# Patient Record
Sex: FEMALE | Race: WHITE | URBAN - METROPOLITAN AREA
[De-identification: names, ages, dates, MRNs, and addresses within clinical notes are randomized per-mention and may not be internally consistent; named-entity substitution may affect disease eponyms.]

---

## 2020-10-30 ENCOUNTER — TELEPHONE ENCOUNTER (OUTPATIENT)
Dept: URBAN - METROPOLITAN AREA CLINIC 98 | Facility: CLINIC | Age: 50
End: 2020-10-30

## 2020-10-30 RX ORDER — PANTOPRAZOLE SODIUM 40 MG/1
TAKE 1 TABLET (40 MG) BY ORAL ROUTE ONCE DAILY FOR 30 DAYS TABLET, DELAYED RELEASE ORAL 1
Qty: 30 | Refills: 6
Start: 2018-09-12

## 2020-12-10 ENCOUNTER — OFFICE VISIT (OUTPATIENT)
Dept: URBAN - METROPOLITAN AREA TELEHEALTH 2 | Facility: TELEHEALTH | Age: 50
End: 2020-12-10

## 2020-12-10 ENCOUNTER — OFFICE VISIT (OUTPATIENT)
Dept: URBAN - METROPOLITAN AREA TELEHEALTH 2 | Facility: TELEHEALTH | Age: 50
End: 2020-12-10
Payer: COMMERCIAL

## 2020-12-10 VITALS — HEIGHT: 64 IN

## 2020-12-10 DIAGNOSIS — R10.33 PERIUMBILICAL ABDOMINAL PAIN: ICD-10-CM

## 2020-12-10 DIAGNOSIS — D12.5 ADENOMA OF SIGMOID COLON: ICD-10-CM

## 2020-12-10 DIAGNOSIS — D12.6 ADENOMATOUS POLYP OF COLON, UNSPECIFIED PART OF COLON: ICD-10-CM

## 2020-12-10 PROCEDURE — G9903 PT SCRN TBCO ID AS NON USER: HCPCS | Performed by: INTERNAL MEDICINE

## 2020-12-10 PROCEDURE — G8417 CALC BMI ABV UP PARAM F/U: HCPCS | Performed by: INTERNAL MEDICINE

## 2020-12-10 PROCEDURE — 1036F TOBACCO NON-USER: CPT | Performed by: INTERNAL MEDICINE

## 2020-12-10 PROCEDURE — 99213 OFFICE O/P EST LOW 20 MIN: CPT | Performed by: INTERNAL MEDICINE

## 2020-12-10 PROCEDURE — 3017F COLORECTAL CA SCREEN DOC REV: CPT | Performed by: INTERNAL MEDICINE

## 2020-12-10 RX ORDER — PANTOPRAZOLE SODIUM 40 MG/1
TAKE 1 TABLET (40 MG) BY ORAL ROUTE ONCE DAILY FOR 30 DAYS TABLET, DELAYED RELEASE ORAL 1
Qty: 30 | Refills: 6 | Status: ACTIVE | COMMUNITY
Start: 2018-09-12

## 2020-12-10 RX ORDER — NORETHINDRONE ACETATE AND ETHINYL ESTRADIOL 1.5; 3 MG/1; UG/1
TAKE 1 TABLET BY ORAL ROUTE ONCE DAILY TABLET ORAL 1
Qty: 0 | Refills: 0 | Status: ACTIVE | COMMUNITY
Start: 1900-01-01 | End: 1900-01-01

## 2020-12-10 RX ORDER — DICYCLOMINE HYDROCHLORIDE 10 MG/1
1 TABLET CAPSULE ORAL THREE TIMES A DAY
Qty: 90 | Refills: 3 | OUTPATIENT
Start: 2020-12-10

## 2020-12-10 NOTE — HPI-TODAY'S VISIT:
Done video on [employer]. had EGD and colon in 11/19 Polyps and GERD- no abreu's noted. Using the pantoprazole- seems to help.  Nagging discomfort in the left periublical region. Intermittent.  Burping does help.  No CIBH - mild constipation.  No rectal bleeding.

## 2021-02-05 ENCOUNTER — TELEPHONE ENCOUNTER (OUTPATIENT)
Dept: URBAN - METROPOLITAN AREA CLINIC 98 | Facility: CLINIC | Age: 51
End: 2021-02-05

## 2021-02-15 ENCOUNTER — LAB OUTSIDE AN ENCOUNTER (OUTPATIENT)
Dept: URBAN - METROPOLITAN AREA CLINIC 98 | Facility: CLINIC | Age: 51
End: 2021-02-15

## 2021-07-15 ENCOUNTER — WEB ENCOUNTER (OUTPATIENT)
Dept: URBAN - METROPOLITAN AREA CLINIC 98 | Facility: CLINIC | Age: 51
End: 2021-07-15

## 2021-07-15 ENCOUNTER — OFFICE VISIT (OUTPATIENT)
Dept: URBAN - METROPOLITAN AREA CLINIC 98 | Facility: CLINIC | Age: 51
End: 2021-07-15
Payer: COMMERCIAL

## 2021-07-15 DIAGNOSIS — R10.9 ABDOMINAL DISCOMFORT: ICD-10-CM

## 2021-07-15 DIAGNOSIS — K21.00 CHRONIC REFLUX ESOPHAGITIS: ICD-10-CM

## 2021-07-15 DIAGNOSIS — Z86.010 PERSONAL HISTORY OF COLONIC POLYPS: ICD-10-CM

## 2021-07-15 PROBLEM — 428283002: Status: ACTIVE | Noted: 2021-07-15

## 2021-07-15 PROCEDURE — 99214 OFFICE O/P EST MOD 30 MIN: CPT | Performed by: INTERNAL MEDICINE

## 2021-07-15 RX ORDER — POLYETHYLENE GLYCOL 3350, SODIUM SULFATE, SODIUM CHLORIDE, POTASSIUM CHLORIDE, ASCORBIC ACID, SODIUM ASCORBATE 140-9-5.2G
AS DIRECTED KIT ORAL
Qty: 1 KIT | Refills: 0 | OUTPATIENT
Start: 2021-07-15 | End: 2021-07-16

## 2021-07-15 RX ORDER — PANTOPRAZOLE SODIUM 40 MG/1
TAKE 1 TABLET (40 MG) BY ORAL ROUTE ONCE DAILY FOR 30 DAYS TABLET, DELAYED RELEASE ORAL 1
Qty: 30 | Refills: 6 | Status: ACTIVE | COMMUNITY
Start: 2018-09-12

## 2021-07-15 RX ORDER — PANTOPRAZOLE SODIUM 40 MG/1
1 TABLET TABLET, DELAYED RELEASE ORAL ONCE A DAY
Qty: 90 TABLET | Refills: 4 | OUTPATIENT
Start: 2021-07-15

## 2021-07-15 RX ORDER — DICYCLOMINE HYDROCHLORIDE 10 MG/1
1 TABLET CAPSULE ORAL THREE TIMES A DAY
Qty: 90 | Refills: 3 | Status: ACTIVE | COMMUNITY
Start: 2020-12-10

## 2021-07-15 RX ORDER — NORETHINDRONE ACETATE AND ETHINYL ESTRADIOL 1.5; 3 MG/1; UG/1
TAKE 1 TABLET BY ORAL ROUTE ONCE DAILY TABLET ORAL 1
Qty: 0 | Refills: 0 | Status: ACTIVE | COMMUNITY
Start: 1900-01-01 | End: 1900-01-01

## 2021-07-15 NOTE — HPI-TODAY'S VISIT:
Done video on [employer]. had EGD and colon in 11/19 Polyps and GERD- no abreu's noted. Using the pantoprazole- seems to help.  Nagging discomfort in the left periublical region. Intermittent.  Burping does help.  No CIBH - mild constipation.  No rectal bleeding.   Present- Having nagging discomfort on left flank 1-2 times per week.  Having issues with constipation. moves every 1-2 days.

## 2021-07-16 PROBLEM — 428283002 HISTORY OF POLYP OF COLON: Status: ACTIVE | Noted: 2021-07-16

## 2021-11-08 ENCOUNTER — OFFICE VISIT (OUTPATIENT)
Dept: URBAN - METROPOLITAN AREA SURGERY CENTER 18 | Facility: SURGERY CENTER | Age: 51
End: 2021-11-08
Payer: COMMERCIAL

## 2021-11-08 ENCOUNTER — TELEPHONE ENCOUNTER (OUTPATIENT)
Dept: URBAN - METROPOLITAN AREA CLINIC 92 | Facility: CLINIC | Age: 51
End: 2021-11-08

## 2021-11-08 ENCOUNTER — CLAIMS CREATED FROM THE CLAIM WINDOW (OUTPATIENT)
Dept: URBAN - METROPOLITAN AREA CLINIC 4 | Facility: CLINIC | Age: 51
End: 2021-11-08
Payer: COMMERCIAL

## 2021-11-08 DIAGNOSIS — K22.89 OTHER SPECIFIED DISEASE OF ESOPHAGUS: ICD-10-CM

## 2021-11-08 DIAGNOSIS — K22.8 OTHER SPECIFIED DISEASES OF ESOPHAGUS: ICD-10-CM

## 2021-11-08 DIAGNOSIS — Z86.010 ADENOMAS PERSONAL HISTORY OF COLONIC POLYPS: ICD-10-CM

## 2021-11-08 DIAGNOSIS — K31.89 ACQUIRED DEFORMITY OF DUODENUM: ICD-10-CM

## 2021-11-08 DIAGNOSIS — R12 BURNING REFLUX: ICD-10-CM

## 2021-11-08 DIAGNOSIS — K31.89 DEFORMED PYLORUS, ACQUIRED: ICD-10-CM

## 2021-11-08 PROCEDURE — 43239 EGD BIOPSY SINGLE/MULTIPLE: CPT | Performed by: INTERNAL MEDICINE

## 2021-11-08 PROCEDURE — G8907 PT DOC NO EVENTS ON DISCHARG: HCPCS | Performed by: INTERNAL MEDICINE

## 2021-11-08 PROCEDURE — G0105 COLORECTAL SCRN; HI RISK IND: HCPCS | Performed by: INTERNAL MEDICINE

## 2021-11-08 PROCEDURE — 88305 TISSUE EXAM BY PATHOLOGIST: CPT | Performed by: PATHOLOGY

## 2021-11-08 PROCEDURE — 88312 SPECIAL STAINS GROUP 1: CPT | Performed by: PATHOLOGY

## 2021-11-08 RX ORDER — PANTOPRAZOLE SODIUM 40 MG/1
1 TABLET TABLET, DELAYED RELEASE ORAL ONCE A DAY
Qty: 90 TABLET | Refills: 4 | OUTPATIENT
Start: 2021-11-08

## 2021-11-08 RX ORDER — NORETHINDRONE ACETATE AND ETHINYL ESTRADIOL 1.5; 3 MG/1; UG/1
TAKE 1 TABLET BY ORAL ROUTE ONCE DAILY TABLET ORAL 1
Qty: 0 | Refills: 0 | Status: ACTIVE | COMMUNITY
Start: 1900-01-01 | End: 1900-01-01

## 2021-11-08 RX ORDER — DICYCLOMINE HYDROCHLORIDE 10 MG/1
1 TABLET CAPSULE ORAL THREE TIMES A DAY
Qty: 90 | Refills: 3 | Status: ACTIVE | COMMUNITY
Start: 2020-12-10

## 2021-11-08 RX ORDER — PANTOPRAZOLE SODIUM 40 MG/1
TAKE 1 TABLET (40 MG) BY ORAL ROUTE ONCE DAILY FOR 30 DAYS TABLET, DELAYED RELEASE ORAL 1
Qty: 30 | Refills: 6 | Status: ACTIVE | COMMUNITY
Start: 2018-09-12

## 2021-11-08 RX ORDER — PANTOPRAZOLE SODIUM 40 MG/1
1 TABLET TABLET, DELAYED RELEASE ORAL ONCE A DAY
Qty: 90 TABLET | Refills: 4 | Status: ACTIVE | COMMUNITY
Start: 2021-07-15

## 2021-11-11 ENCOUNTER — WEB ENCOUNTER (OUTPATIENT)
Dept: URBAN - METROPOLITAN AREA CLINIC 96 | Facility: CLINIC | Age: 51
End: 2021-11-11

## 2021-11-13 ENCOUNTER — WEB ENCOUNTER (OUTPATIENT)
Dept: URBAN - METROPOLITAN AREA CLINIC 98 | Facility: CLINIC | Age: 51
End: 2021-11-13

## 2021-11-17 ENCOUNTER — WEB ENCOUNTER (OUTPATIENT)
Dept: URBAN - METROPOLITAN AREA CLINIC 96 | Facility: CLINIC | Age: 51
End: 2021-11-17

## 2021-11-17 RX ORDER — PANTOPRAZOLE SODIUM 20 MG/1
1 TABLET TABLET, DELAYED RELEASE ORAL ONCE A DAY
Qty: 30 | Refills: 2 | OUTPATIENT
Start: 2021-11-18

## 2021-11-18 ENCOUNTER — WEB ENCOUNTER (OUTPATIENT)
Dept: URBAN - METROPOLITAN AREA CLINIC 96 | Facility: CLINIC | Age: 51
End: 2021-11-18

## 2021-11-19 ENCOUNTER — WEB ENCOUNTER (OUTPATIENT)
Dept: URBAN - METROPOLITAN AREA CLINIC 96 | Facility: CLINIC | Age: 51
End: 2021-11-19

## 2023-05-09 ENCOUNTER — OFFICE VISIT (OUTPATIENT)
Dept: URBAN - METROPOLITAN AREA CLINIC 82 | Facility: CLINIC | Age: 53
End: 2023-05-09

## 2023-05-09 RX ORDER — PANTOPRAZOLE SODIUM 40 MG/1
1 TABLET TABLET, DELAYED RELEASE ORAL ONCE A DAY
Qty: 90 TABLET | Refills: 4 | Status: ACTIVE | COMMUNITY
Start: 2021-11-08

## 2023-05-09 RX ORDER — PANTOPRAZOLE SODIUM 40 MG/1
1 TABLET TABLET, DELAYED RELEASE ORAL ONCE A DAY
Qty: 90 TABLET | Refills: 4 | Status: ACTIVE | COMMUNITY
Start: 2021-07-15

## 2023-05-09 RX ORDER — NORETHINDRONE ACETATE AND ETHINYL ESTRADIOL 1.5; 3 MG/1; UG/1
TAKE 1 TABLET BY ORAL ROUTE ONCE DAILY TABLET ORAL 1
Qty: 0 | Refills: 0 | Status: ACTIVE | COMMUNITY
Start: 1900-01-01 | End: 1900-01-01

## 2023-05-09 RX ORDER — PANTOPRAZOLE SODIUM 20 MG/1
1 TABLET TABLET, DELAYED RELEASE ORAL ONCE A DAY
Qty: 30 | Refills: 2 | Status: ACTIVE | COMMUNITY
Start: 2021-11-18

## 2023-05-09 RX ORDER — PANTOPRAZOLE SODIUM 40 MG/1
TAKE 1 TABLET (40 MG) BY ORAL ROUTE ONCE DAILY FOR 30 DAYS TABLET, DELAYED RELEASE ORAL 1
Qty: 30 | Refills: 6 | Status: ACTIVE | COMMUNITY
Start: 2018-09-12

## 2023-05-09 RX ORDER — DICYCLOMINE HYDROCHLORIDE 10 MG/1
1 TABLET CAPSULE ORAL THREE TIMES A DAY
Qty: 90 | Refills: 3 | Status: ACTIVE | COMMUNITY
Start: 2020-12-10

## 2023-05-23 ENCOUNTER — OFFICE VISIT (OUTPATIENT)
Dept: URBAN - METROPOLITAN AREA CLINIC 82 | Facility: CLINIC | Age: 53
End: 2023-05-23
Payer: COMMERCIAL

## 2023-05-23 VITALS
HEIGHT: 64 IN | SYSTOLIC BLOOD PRESSURE: 118 MMHG | DIASTOLIC BLOOD PRESSURE: 81 MMHG | BODY MASS INDEX: 32.78 KG/M2 | WEIGHT: 192 LBS | TEMPERATURE: 97.1 F | HEART RATE: 96 BPM

## 2023-05-23 DIAGNOSIS — R14.0 BLOATING: ICD-10-CM

## 2023-05-23 DIAGNOSIS — K42.9 UMBILICAL HERNIA WITHOUT OBSTRUCTION AND WITHOUT GANGRENE: ICD-10-CM

## 2023-05-23 DIAGNOSIS — R10.84 GENERALIZED ABDOMINAL PAIN: ICD-10-CM

## 2023-05-23 DIAGNOSIS — K59.01 SLOW TRANSIT CONSTIPATION: ICD-10-CM

## 2023-05-23 PROBLEM — 35298007: Status: ACTIVE | Noted: 2023-05-23

## 2023-05-23 PROCEDURE — G8417 CALC BMI ABV UP PARAM F/U: HCPCS | Performed by: INTERNAL MEDICINE

## 2023-05-23 PROCEDURE — G8427 DOCREV CUR MEDS BY ELIG CLIN: HCPCS | Performed by: INTERNAL MEDICINE

## 2023-05-23 PROCEDURE — G9903 PT SCRN TBCO ID AS NON USER: HCPCS | Performed by: INTERNAL MEDICINE

## 2023-05-23 PROCEDURE — 99214 OFFICE O/P EST MOD 30 MIN: CPT | Performed by: INTERNAL MEDICINE

## 2023-05-23 RX ORDER — PANTOPRAZOLE SODIUM 20 MG/1
1 TABLET TABLET, DELAYED RELEASE ORAL ONCE A DAY
Qty: 30 | Refills: 2 | Status: ACTIVE | COMMUNITY
Start: 2021-11-18

## 2023-05-23 RX ORDER — LINACLOTIDE 72 UG/1
1 CAPSULE AT LEAST 30 MINUTES BEFORE THE FIRST MEAL OF THE DAY ON AN EMPTY STOMACH CAPSULE, GELATIN COATED ORAL ONCE A DAY
Qty: 30 | Refills: 1 | OUTPATIENT
Start: 2023-05-23 | End: 2023-07-22

## 2023-05-23 RX ORDER — DICYCLOMINE HYDROCHLORIDE 10 MG/1
1 TABLET CAPSULE ORAL THREE TIMES A DAY
Qty: 90 | Refills: 3 | Status: DISCONTINUED | COMMUNITY
Start: 2020-12-10

## 2023-05-23 RX ORDER — NORETHINDRONE ACETATE AND ETHINYL ESTRADIOL 1.5; 3 MG/1; UG/1
TAKE 1 TABLET BY ORAL ROUTE ONCE DAILY TABLET ORAL 1
Qty: 0 | Refills: 0 | Status: DISCONTINUED | COMMUNITY
Start: 1900-01-01

## 2023-05-23 RX ORDER — PANTOPRAZOLE SODIUM 40 MG/1
1 TABLET TABLET, DELAYED RELEASE ORAL ONCE A DAY
Qty: 90 TABLET | Refills: 4 | Status: DISCONTINUED | COMMUNITY
Start: 2021-07-15

## 2023-05-23 NOTE — HPI-TODAY'S VISIT:
5/23/2023 Patient is a 53 year old  female who presents for abdominal pain and weight gain. Patient states that she has a lot of bloating and tenderness in her lower abdominal area. Patient states that she has constipation and that she takes fiber for it which helps some. She states that she has some an umbilical hernia as seen in a CT scan. She states that she has been having a good bit of stress lately.

## 2023-06-19 ENCOUNTER — TELEPHONE ENCOUNTER (OUTPATIENT)
Dept: URBAN - METROPOLITAN AREA CLINIC 95 | Facility: CLINIC | Age: 53
End: 2023-06-19

## 2023-06-23 ENCOUNTER — OFFICE VISIT (OUTPATIENT)
Dept: URBAN - METROPOLITAN AREA CLINIC 82 | Facility: CLINIC | Age: 53
End: 2023-06-23

## 2023-08-17 ENCOUNTER — TELEPHONE ENCOUNTER (OUTPATIENT)
Dept: URBAN - METROPOLITAN AREA CLINIC 97 | Facility: CLINIC | Age: 53
End: 2023-08-17

## 2023-08-17 ENCOUNTER — DASHBOARD ENCOUNTERS (OUTPATIENT)
Age: 53
End: 2023-08-17

## 2023-08-17 ENCOUNTER — OFFICE VISIT (OUTPATIENT)
Dept: URBAN - METROPOLITAN AREA CLINIC 98 | Facility: CLINIC | Age: 53
End: 2023-08-17
Payer: COMMERCIAL

## 2023-08-17 VITALS
HEART RATE: 83 BPM | HEIGHT: 64 IN | WEIGHT: 187 LBS | BODY MASS INDEX: 31.92 KG/M2 | TEMPERATURE: 97.3 F | DIASTOLIC BLOOD PRESSURE: 71 MMHG | SYSTOLIC BLOOD PRESSURE: 110 MMHG

## 2023-08-17 DIAGNOSIS — K42.9 UMBILICAL HERNIA WITHOUT OBSTRUCTION AND WITHOUT GANGRENE: ICD-10-CM

## 2023-08-17 DIAGNOSIS — K76.9 LIVER LESION: ICD-10-CM

## 2023-08-17 DIAGNOSIS — R10.84 GENERALIZED ABDOMINAL PAIN: ICD-10-CM

## 2023-08-17 DIAGNOSIS — K59.01 SLOW TRANSIT CONSTIPATION: ICD-10-CM

## 2023-08-17 DIAGNOSIS — R14.0 BLOATING: ICD-10-CM

## 2023-08-17 PROBLEM — 300331000: Status: ACTIVE | Noted: 2023-08-17

## 2023-08-17 PROCEDURE — 99214 OFFICE O/P EST MOD 30 MIN: CPT | Performed by: INTERNAL MEDICINE

## 2023-08-17 RX ORDER — LINACLOTIDE 72 UG/1
1 CAPSULE AT LEAST 30 MINUTES BEFORE THE FIRST MEAL OF THE DAY ON AN EMPTY STOMACH CAPSULE, GELATIN COATED ORAL ONCE A DAY
Qty: 30 | Refills: 6 | OUTPATIENT
Start: 2023-08-17 | End: 2024-03-14

## 2023-08-17 NOTE — HPI-TODAY'S VISIT:
5/23/2023 Patient is a 53 year old  female who presents for abdominal pain and weight gain. Patient states that she has a lot of bloating and tenderness in her lower abdominal area. Patient states that she has constipation and that she takes fiber for it which helps some. She states that she has some an umbilical hernia as seen in a CT scan. She states that she has been having a good bit of stress lately.  Present - issues with bloating - non contrast CT- liver lesion- small umblical hernia - recurrent UTI - EGD and colon in 2021 - more GERD as well - more constipation  as well.

## 2023-09-21 ENCOUNTER — WEB ENCOUNTER (OUTPATIENT)
Dept: URBAN - METROPOLITAN AREA CLINIC 98 | Facility: CLINIC | Age: 53
End: 2023-09-21

## 2023-09-25 ENCOUNTER — WEB ENCOUNTER (OUTPATIENT)
Dept: URBAN - METROPOLITAN AREA CLINIC 98 | Facility: CLINIC | Age: 53
End: 2023-09-25

## 2023-09-25 RX ORDER — LINACLOTIDE 145 UG/1
1 CAPSULE AT LEAST 30 MINUTES BEFORE THE FIRST MEAL OF THE DAY ON AN EMPTY STOMACH CAPSULE, GELATIN COATED ORAL ONCE A DAY
Qty: 30 | Refills: 3 | OUTPATIENT
Start: 2023-09-25 | End: 2023-10-25

## 2023-10-02 ENCOUNTER — WEB ENCOUNTER (OUTPATIENT)
Dept: URBAN - METROPOLITAN AREA CLINIC 98 | Facility: CLINIC | Age: 53
End: 2023-10-02

## 2024-07-18 ENCOUNTER — OFFICE VISIT (OUTPATIENT)
Dept: URBAN - METROPOLITAN AREA CLINIC 98 | Facility: CLINIC | Age: 54
End: 2024-07-18

## 2024-08-13 ENCOUNTER — WEB ENCOUNTER (OUTPATIENT)
Dept: URBAN - METROPOLITAN AREA CLINIC 98 | Facility: CLINIC | Age: 54
End: 2024-08-13

## 2024-08-13 RX ORDER — HYDROCORTISONE ACETATE, PRAMOXINE HCL 2.5; 1 G/100G; G/100G
1 APPLICATION CREAM TOPICAL THREE TIMES A DAY
Qty: 1 KIT | Refills: 0 | OUTPATIENT
Start: 2024-08-14 | End: 2024-08-28

## 2024-08-14 ENCOUNTER — WEB ENCOUNTER (OUTPATIENT)
Dept: URBAN - METROPOLITAN AREA CLINIC 98 | Facility: CLINIC | Age: 54
End: 2024-08-14

## 2024-08-14 RX ORDER — HYDROCORTISONE 25 MG/G
1 APPLICATION CREAM TOPICAL TWICE A DAY
Qty: 1 KIT | Refills: 0 | OUTPATIENT
Start: 2024-08-14 | End: 2024-08-28

## 2024-09-05 ENCOUNTER — OFFICE VISIT (OUTPATIENT)
Dept: URBAN - METROPOLITAN AREA CLINIC 98 | Facility: CLINIC | Age: 54
End: 2024-09-05

## 2024-09-05 ENCOUNTER — LAB OUTSIDE AN ENCOUNTER (OUTPATIENT)
Dept: URBAN - METROPOLITAN AREA CLINIC 98 | Facility: CLINIC | Age: 54
End: 2024-09-05

## 2024-09-05 ENCOUNTER — TELEPHONE ENCOUNTER (OUTPATIENT)
Dept: URBAN - METROPOLITAN AREA CLINIC 98 | Facility: CLINIC | Age: 54
End: 2024-09-05

## 2024-09-05 PROBLEM — 428283002: Status: ACTIVE | Noted: 2024-09-05

## 2024-09-05 PROBLEM — 266433003: Status: ACTIVE | Noted: 2024-09-05

## 2024-09-20 ENCOUNTER — WEB ENCOUNTER (OUTPATIENT)
Dept: URBAN - METROPOLITAN AREA CLINIC 98 | Facility: CLINIC | Age: 54
End: 2024-09-20

## 2024-10-21 ENCOUNTER — OFFICE VISIT (OUTPATIENT)
Dept: URBAN - METROPOLITAN AREA CLINIC 98 | Facility: CLINIC | Age: 54
End: 2024-10-21

## 2024-10-22 ENCOUNTER — WEB ENCOUNTER (OUTPATIENT)
Dept: URBAN - METROPOLITAN AREA CLINIC 98 | Facility: CLINIC | Age: 54
End: 2024-10-22

## 2024-10-23 ENCOUNTER — WEB ENCOUNTER (OUTPATIENT)
Dept: URBAN - METROPOLITAN AREA CLINIC 98 | Facility: CLINIC | Age: 54
End: 2024-10-23

## 2024-10-26 ENCOUNTER — WEB ENCOUNTER (OUTPATIENT)
Dept: URBAN - METROPOLITAN AREA CLINIC 98 | Facility: CLINIC | Age: 54
End: 2024-10-26

## 2024-10-28 ENCOUNTER — WEB ENCOUNTER (OUTPATIENT)
Dept: URBAN - METROPOLITAN AREA CLINIC 98 | Facility: CLINIC | Age: 54
End: 2024-10-28

## 2024-10-29 ENCOUNTER — OFFICE VISIT (OUTPATIENT)
Dept: URBAN - METROPOLITAN AREA SURGERY CENTER 18 | Facility: SURGERY CENTER | Age: 54
End: 2024-10-29

## 2024-11-01 ENCOUNTER — WEB ENCOUNTER (OUTPATIENT)
Dept: URBAN - METROPOLITAN AREA CLINIC 98 | Facility: CLINIC | Age: 54
End: 2024-11-01

## 2024-11-04 ENCOUNTER — WEB ENCOUNTER (OUTPATIENT)
Dept: URBAN - METROPOLITAN AREA CLINIC 98 | Facility: CLINIC | Age: 54
End: 2024-11-04

## 2024-11-11 ENCOUNTER — OFFICE VISIT (OUTPATIENT)
Dept: URBAN - NONMETROPOLITAN AREA CLINIC 4 | Facility: CLINIC | Age: 54
End: 2024-11-11
Payer: COMMERCIAL

## 2024-11-11 VITALS
HEIGHT: 64 IN | WEIGHT: 143 LBS | HEART RATE: 88 BPM | TEMPERATURE: 97.8 F | DIASTOLIC BLOOD PRESSURE: 66 MMHG | BODY MASS INDEX: 24.41 KG/M2 | SYSTOLIC BLOOD PRESSURE: 97 MMHG

## 2024-11-11 DIAGNOSIS — K64.0 GRADE I HEMORRHOIDS: ICD-10-CM

## 2024-11-11 PROCEDURE — 99212 OFFICE O/P EST SF 10 MIN: CPT | Performed by: INTERNAL MEDICINE

## 2024-11-11 PROCEDURE — 46221 LIGATION OF HEMORRHOID(S): CPT | Performed by: INTERNAL MEDICINE

## 2024-11-11 NOTE — HPI-TODAY'S VISIT:
Pt presents for elective hemorrhoidal banding. Suffers from chronic constipation. Recent increase in stress, loss her father.  s/p C scope on 10/29/24- suffers from chronic constipation. C scope results reviewed. Small internal hemorrhoids  Previous visit:    Pt reports that she has had itching and burning 5/23/2023 Patient is a 53 year old  female who presents for abdominal pain and weight gain. Patient states that she has a lot of bloating and tenderness in her lower abdominal area. Patient states that she has constipation and that she takes fiber for it which helps some. She states that she has some an umbilical hernia as seen in a CT scan. She states that she has been having a good bit of stress lately.  Present - issues with bloating - non contrast CT- liver lesion- small umblical hernia - recurrent UTI - EGD and colon in 2021 - more GERD as well - more constipation  as well.

## 2024-11-13 ENCOUNTER — WEB ENCOUNTER (OUTPATIENT)
Dept: URBAN - NONMETROPOLITAN AREA CLINIC 4 | Facility: CLINIC | Age: 54
End: 2024-11-13

## 2024-11-14 ENCOUNTER — WEB ENCOUNTER (OUTPATIENT)
Dept: URBAN - METROPOLITAN AREA CLINIC 98 | Facility: CLINIC | Age: 54
End: 2024-11-14

## 2024-11-14 ENCOUNTER — WEB ENCOUNTER (OUTPATIENT)
Dept: URBAN - NONMETROPOLITAN AREA CLINIC 4 | Facility: CLINIC | Age: 54
End: 2024-11-14

## 2024-11-15 ENCOUNTER — WEB ENCOUNTER (OUTPATIENT)
Dept: URBAN - NONMETROPOLITAN AREA CLINIC 4 | Facility: CLINIC | Age: 54
End: 2024-11-15

## 2024-12-02 ENCOUNTER — WEB ENCOUNTER (OUTPATIENT)
Dept: URBAN - NONMETROPOLITAN AREA CLINIC 4 | Facility: CLINIC | Age: 54
End: 2024-12-02

## 2024-12-03 ENCOUNTER — OFFICE VISIT (OUTPATIENT)
Dept: URBAN - NONMETROPOLITAN AREA CLINIC 4 | Facility: CLINIC | Age: 54
End: 2024-12-03
Payer: COMMERCIAL

## 2024-12-03 VITALS
SYSTOLIC BLOOD PRESSURE: 97 MMHG | HEART RATE: 79 BPM | DIASTOLIC BLOOD PRESSURE: 75 MMHG | BODY MASS INDEX: 24.59 KG/M2 | WEIGHT: 144 LBS | TEMPERATURE: 98.4 F | HEIGHT: 64 IN

## 2024-12-03 DIAGNOSIS — K64.8 INTERNAL HEMORRHOIDS: ICD-10-CM

## 2024-12-03 DIAGNOSIS — K62.5 RECTAL BLEEDING: ICD-10-CM

## 2024-12-03 PROCEDURE — 99214 OFFICE O/P EST MOD 30 MIN: CPT | Performed by: REGISTERED NURSE

## 2024-12-03 NOTE — PHYSICAL EXAM RECTAL:
normal tone, no external hemorrhoids, no masses palpable, no red blood, Skin tags are present. Superficial skin wound with no tenderness, drainage or bleeding.

## 2024-12-03 NOTE — HPI-TODAY'S VISIT:
Pt presents for elective hemorrhoidal banding. Suffers from chronic constipation. Recent increase in stress, loss her father.  s/p C scope on 10/29/24- suffers from chronic constipation. C scope results reviewed. Small internal hemorrhoids  Previous visit:    Pt reports that she has had itching and burning 5/23/2023 Patient is a 53 year old  female who presents for abdominal pain and weight gain. Patient states that she has a lot of bloating and tenderness in her lower abdominal area. Patient states that she has constipation and that she takes fiber for it which helps some. She states that she has some an umbilical hernia as seen in a CT scan. She states that she has been having a good bit of stress lately.  Present - issues with bloating - non contrast CT- liver lesion- small umblical hernia - recurrent UTI - EGD and colon in 2021 - more GERD as well - more constipation  as well.  12/3/24: Pt RTC with c/o blood on toilet paper that started yesterday after straining with BM. She also noticed a foul smell. She noticed blood on her stool today. No foul smell noted today. She reports rectal pressure, but no severe pain. She takes Miralax and Calm as needed. She reports she scratced herself near rectum while showering a few days prior.
The patient is a 76y Female complaining of

## 2024-12-05 ENCOUNTER — OFFICE VISIT (OUTPATIENT)
Dept: URBAN - NONMETROPOLITAN AREA CLINIC 4 | Facility: CLINIC | Age: 54
End: 2024-12-05

## 2024-12-06 ENCOUNTER — TELEPHONE ENCOUNTER (OUTPATIENT)
Dept: URBAN - NONMETROPOLITAN AREA CLINIC 4 | Facility: CLINIC | Age: 54
End: 2024-12-06

## 2024-12-06 ENCOUNTER — WEB ENCOUNTER (OUTPATIENT)
Dept: URBAN - NONMETROPOLITAN AREA CLINIC 4 | Facility: CLINIC | Age: 54
End: 2024-12-06

## 2024-12-06 RX ORDER — HYDROCORTISONE ACETATE 25 MG/1
1 SUPPOSITORY SUPPOSITORY RECTAL TWICE DAILY
Qty: 28 | Refills: 1 | OUTPATIENT
Start: 2024-12-06 | End: 2025-01-03

## 2024-12-11 ENCOUNTER — WEB ENCOUNTER (OUTPATIENT)
Dept: URBAN - NONMETROPOLITAN AREA CLINIC 4 | Facility: CLINIC | Age: 54
End: 2024-12-11

## 2024-12-11 ENCOUNTER — TELEPHONE ENCOUNTER (OUTPATIENT)
Dept: URBAN - NONMETROPOLITAN AREA CLINIC 4 | Facility: CLINIC | Age: 54
End: 2024-12-11

## 2024-12-12 ENCOUNTER — OFFICE VISIT (OUTPATIENT)
Dept: URBAN - NONMETROPOLITAN AREA CLINIC 4 | Facility: CLINIC | Age: 54
End: 2024-12-12

## 2024-12-19 ENCOUNTER — WEB ENCOUNTER (OUTPATIENT)
Dept: URBAN - NONMETROPOLITAN AREA CLINIC 4 | Facility: CLINIC | Age: 54
End: 2024-12-19

## 2024-12-20 ENCOUNTER — WEB ENCOUNTER (OUTPATIENT)
Dept: URBAN - NONMETROPOLITAN AREA CLINIC 4 | Facility: CLINIC | Age: 54
End: 2024-12-20

## 2024-12-20 ENCOUNTER — OFFICE VISIT (OUTPATIENT)
Dept: URBAN - METROPOLITAN AREA CLINIC 54 | Facility: CLINIC | Age: 54
End: 2024-12-20

## 2024-12-30 ENCOUNTER — OFFICE VISIT (OUTPATIENT)
Dept: URBAN - NONMETROPOLITAN AREA CLINIC 4 | Facility: CLINIC | Age: 54
End: 2024-12-30

## 2025-01-06 ENCOUNTER — WEB ENCOUNTER (OUTPATIENT)
Dept: URBAN - NONMETROPOLITAN AREA CLINIC 4 | Facility: CLINIC | Age: 55
End: 2025-01-06

## 2025-01-13 ENCOUNTER — WEB ENCOUNTER (OUTPATIENT)
Dept: URBAN - NONMETROPOLITAN AREA CLINIC 4 | Facility: CLINIC | Age: 55
End: 2025-01-13

## 2025-01-21 ENCOUNTER — OFFICE VISIT (OUTPATIENT)
Dept: URBAN - NONMETROPOLITAN AREA CLINIC 4 | Facility: CLINIC | Age: 55
End: 2025-01-21
Payer: COMMERCIAL

## 2025-01-21 VITALS
HEART RATE: 77 BPM | DIASTOLIC BLOOD PRESSURE: 70 MMHG | BODY MASS INDEX: 24.92 KG/M2 | HEIGHT: 64 IN | TEMPERATURE: 98.1 F | WEIGHT: 146 LBS | SYSTOLIC BLOOD PRESSURE: 103 MMHG

## 2025-01-21 DIAGNOSIS — K92.1 BLOOD IN STOOL: ICD-10-CM

## 2025-01-21 DIAGNOSIS — K64.0 GRADE I INTERNAL HEMORRHOIDS: ICD-10-CM

## 2025-01-21 PROCEDURE — 46221 LIGATION OF HEMORRHOID(S): CPT | Performed by: INTERNAL MEDICINE

## 2025-01-21 PROCEDURE — 99212 OFFICE O/P EST SF 10 MIN: CPT | Performed by: INTERNAL MEDICINE

## 2025-01-21 NOTE — EXAM-PHYSICAL EXAM
rt posterior hemorrhoidal column persistent (previously banded but dislodge). Associated skin tag. I do not see any additional significant hemorrhoidal internal columns. Grade II  Hemorrhoid banding was performed during today's visit. Informed consent for hemorrhoid procedures was obtained prior to the procedure, either today or previously on file. The risks, alternatives and benefits were discussed completely using visual aids when appropriate. Questions were answered to the best of my ability, including potential limitations of the procedure and possible need for hemorrhoid surgery if necessary. The patient was in the left lateral decubitus position during the procedure. Digital rectal examination were performed as part of the procedure. Stool in rectal vault hard balls.Hemorrhoid banding procedure: banding was performed in standard fashion without complication using the [facility] hemorrhoid banding device. Location: Th rt postrior grade I was banded today. Standard post procedure instructions were discussed with the patient. Written instructions were provided for the patient after the procedure.

## 2025-01-21 NOTE — HPI-TODAY'S VISIT:
Pt presents for elective hemorrhoidal banding. Suffers from chronic constipation. Recent increase in stress, loss her father.  s/p C scope on 10/29/24- suffers from chronic constipation. C scope results reviewed. Small internal hemorrhoids  Previous visit:    Pt reports that she has had itching and burning 5/23/2023 Patient is a 53 year old  female who presents for abdominal pain and weight gain. Patient states that she has a lot of bloating and tenderness in her lower abdominal area. Patient states that she has constipation and that she takes fiber for it which helps some. She states that she has some an umbilical hernia as seen in a CT scan. She states that she has been having a good bit of stress lately.  Present - issues with bloating - non contrast CT- liver lesion- small umblical hernia - recurrent UTI - EGD and colon in 2021 - more GERD as well - more constipation  as well.  12/3/24: Pt RTC with c/o blood on toilet paper that started yesterday after straining with BM. She also noticed a foul smell. She noticed blood on her stool today. No foul smell noted today. She reports rectal pressure, but no severe pain. She takes Miralax and Calm as needed. She reports she scratced herself near rectum while showering a few days prior. 1-21-25 Pt reports that she has lost her dogs recently.   Pt reports that she has had improvement in her rectal pressure. Pt reports that she has had improvement in her bowel movements.   Pt reports that she tries not to push.   Pt reports that she saw the rectal exam.  Pt reports that she was given a prescription for a steroid twice daily and did evenings for about 5 days.  Pt reports that she sometimes feels like she does not empty all the way.   Pt reports that she had the bands to dislodge.  Pt reports that she has external hemorrhoids as well.  States has some billing challenges after last ov with banding. Last visit with left lateral and rt posterior banding

## 2025-02-17 ENCOUNTER — WEB ENCOUNTER (OUTPATIENT)
Dept: URBAN - NONMETROPOLITAN AREA CLINIC 4 | Facility: CLINIC | Age: 55
End: 2025-02-17

## 2025-02-18 ENCOUNTER — OFFICE VISIT (OUTPATIENT)
Dept: URBAN - NONMETROPOLITAN AREA CLINIC 4 | Facility: CLINIC | Age: 55
End: 2025-02-18

## 2025-02-18 NOTE — HPI-TODAY'S VISIT:
Pt presents for elective hemorrhoidal banding. Suffers from chronic constipation. Recent increase in stress, loss her father.  s/p C scope on 10/29/24- suffers from chronic constipation. C scope results reviewed. Small internal hemorrhoids  Previous visit:    Pt reports that she has had itching and burning 5/23/2023 Patient is a 53 year old  female who presents for abdominal pain and weight gain. Patient states that she has a lot of bloating and tenderness in her lower abdominal area. Patient states that she has constipation and that she takes fiber for it which helps some. She states that she has some an umbilical hernia as seen in a CT scan. She states that she has been having a good bit of stress lately.  Present - issues with bloating - non contrast CT- liver lesion- small umblical hernia - recurrent UTI - EGD and colon in 2021 - more GERD as well - more constipation  as well.  12/3/24: Pt RTC with c/o blood on toilet paper that started yesterday after straining with BM. She also noticed a foul smell. She noticed blood on her stool today. No foul smell noted today. She reports rectal pressure, but no severe pain. She takes Miralax and Calm as needed. She reports she scratced herself near rectum while showering a few days prior. 1-21-25 Pt reports that she has lost her dogs recently.   Pt reports that she has had improvement in her rectal pressure. Pt reports that she has had improvement in her bowel movements.   Pt reports that she tries not to push.   Pt reports that she saw the rectal exam.  Pt reports that she was given a prescription for a steroid twice daily and did evenings for about 5 days.  Pt reports that she sometimes feels like she does not empty all the way.   Pt reports that she had the bands to dislodge.  Pt reports that she has external hemorrhoids as well.  States has some billing challenges after last ov with banding. Last visit with left lateral and rt posterior banding  2/18/2025 Lst visit rt posterior hemorrhoidal column persistent (previously banded but dislodge). Associated skin tag. I do not see any additional significant hemorrhoidal internal columns. Grade II Hemorrhoid banding was performed during today's visit. Informed consent for hemorrhoid procedures was obtained prior to the procedure, either today or previously on file. The risks, alternatives and benefits were discussed completely using visual aids when appropriate. Questions were answered to the best of my ability, including potential limitations of the procedure and possible need for hemorrhoid surgery if necessary. The patient was in the left lateral decubitus position during the procedure. Digital rectal examination were performed as part of the procedure. Stool in rectal vault hard balls.Hemorrhoid banding procedure: banding was performed in standard fashion without complication using the [facility] hemorrhoid banding device. [facility]. Standard post procedure instructions were discussed with the patient. Written instructions were provided for the patient after the procedure.  Patient returns for f/u visit.

## 2025-04-03 ENCOUNTER — OFFICE VISIT (OUTPATIENT)
Dept: URBAN - METROPOLITAN AREA CLINIC 78 | Facility: CLINIC | Age: 55
End: 2025-04-03
Payer: COMMERCIAL

## 2025-04-03 DIAGNOSIS — Z86.0101 PERSONAL HISTORY OF ADENOMATOUS AND SERRATED COLON POLYPS: ICD-10-CM

## 2025-04-03 DIAGNOSIS — K76.0 METABOLIC DYSFUNCTION-ASSOCIATED STEATOTIC LIVER DISEASE (MASLD): ICD-10-CM

## 2025-04-03 DIAGNOSIS — K62.5 RECTAL BLEEDING: ICD-10-CM

## 2025-04-03 DIAGNOSIS — K59.09 INTERMITTENT CONSTIPATION: ICD-10-CM

## 2025-04-03 DIAGNOSIS — Z80.0 FAMILY HISTORY OF STOMACH CANCER: ICD-10-CM

## 2025-04-03 DIAGNOSIS — K64.1 BLEEDING GRADE II HEMORRHOIDS: ICD-10-CM

## 2025-04-03 DIAGNOSIS — K60.2 ANAL FISSURE: ICD-10-CM

## 2025-04-03 PROCEDURE — 46600 DIAGNOSTIC ANOSCOPY SPX: CPT | Performed by: INTERNAL MEDICINE

## 2025-04-03 PROCEDURE — 99214 OFFICE O/P EST MOD 30 MIN: CPT | Performed by: INTERNAL MEDICINE

## 2025-04-03 RX ORDER — HYDROCORTISONE ACETATE 25 MG/1
1 SUPPOSITORY SUPPOSITORY RECTAL
Qty: 14 | Refills: 0 | OUTPATIENT
Start: 2025-04-04 | End: 2025-04-11

## 2025-04-03 NOTE — HPI-TODAY'S VISIT:
The patient was referred to us by Fiorella Iyer for anal bleeding and anal pain. A copy of this note will be sent to the referring physician.  She is here for a second opinion regarding hemorrhoid banding,  She was previously seen by Dr. Sindy Cárdenas for anal itching and burning since 5/2023, abdominal pain and weight gain.   She states she was banded x2 but is upset as her insurance never covered it. She has a large bill now to pay. She was told the third hemorrhoid was very small and did not need banding.   She completed banding of the left lateral and rt posterior hemorrhoidal plexuses. The R posterior hemorrhoidal column was banded but the band dislodge soon after She has ~ 4BMs a weeks. She started consuming Fiber One + fiber supplements and Miralax with good results. If she has a harder BM, then she notices that her hemorrhoidal symptom are exacerbated. She uses wipes which has helped as well. She has mucus in her stools. Her stools have been dark brown but not black/tarry.   Patient states that she has a lot of bloating and tenderness in her lower abdominal area.   She has had improvement in her rectal pressure;m she previously noted a sense of incomplete evacuation. She tries not to strain.   Pt reports that she has external hemorrhoids as well.   Her GM had stomach cancer. There is no FH of colon cancer, her father had colon polyps.    There is no recent history of rectal bleeding. The patient has no pertinent additional complaints of abdominal pain, constipation, diarrhea, bloating, rectal pain, anorexia or unintentional weight loss.        Regarding any upper GI complaints, the patient has not had heartburn, nausea, vomiting or dysphagia.      She is now on Tirzepatide. She has been able to lose some weight on this.  She has been struggling with recurrent UTIs.       The patient does not take blood thinners.       They deny any CP or GOTTI.     She had been told she had hepatic steatosis. She last had normal liver enzymes a few months ago.         Summary of prior workup: - EGD/colonoscopy by Dr. Raul Sheets on 10/29/24: Irregular Z-line.  Erythematous antrum.  Normal duodenum.  \f1 Normal terminal ileum.  Diminutive TA in the hepatic flexure.  2  TAs in the transverse colon.  Nonbleeding hemorrhoids noted on retroflexion.  Quality of the prep was good a repeat colonoscopy was advised in 3 years  - Labs on 8/6/2024: Total cholesterol 213, HDL 45, triglycerides 92, , glucose 78, BUN 15, creatinine 0.5, sodium 139, potassium 4.4, calcium 9.2, total protein 6.4, albumin 4.2, total bilirubin 0.3, alkaline phosphatase 58, AST 13, ALT 26.  WBC 6.5, hemoglobin 14.6, MCV 97, platelets 272. -MRI of the abdomen with and without contrast on 9/6/2023: Mild diffuse hepatic steatosis.  Subcentimeter lesion in the posterior segment of the right hepatic lobe consistent with a flash filling hemangioma.  Duplicated renal collecting system bilaterally, normal variant anatomy.  No hydronephrosis.  Small renal cysts. - E/C in 2021  By Dr. Raul Sheets: Irregular Z-line, antral erythema.  Normal duodenum.  Biopsies negative for H. pylori, BE sent or EoE. Normal terminal ileum.  Diffuse melanosis throughout the entire colon.  Nonbleeding internal hemorrhoids noted on retroflexion.  Quality of the prep was good. - E/C by Raul Sheets on 11/14/2019: Irregular Z-line.  Normal stomach and duodenum.  Normal terminal ileum, 2 tubular adenomas removed from the sigmoid and transverse colon.  Diffuse melanosis.  Nonbleeding internal hemorrhoids. - EGD on 9/14/2018: LA grade a reflux esophagitis. - Colonoscopy on 7/21/2017: 2 mm TA in the hepatic flexure.  Normal terminal ileum.  Nonbleeding internal hemorrhoids.  -CT abdomen with contrast on 10/1/2015: Constipation.  Focal area of enhancement involving the peripheral right lobe of the liver representing a flash filling hemangioma.  No free fluid or free air.  Tiny inguinal lymph nodes.  No acute inflammatory process involving the pelvis.  Scattered vascular calcifications.  Spleen, adrenal glands and pancreas normal.  Probable cortical cysts in both kidneys.

## 2025-04-03 NOTE — PHYSICAL EXAM GASTROINTESTINAL
Abdomen , soft, nontender, nondistended , no guarding or rigidity , no masses palpable , normal bowel sounds , Liver and Spleen , no hepatomegaly present , no hepatosplenomegaly , liver nontender , spleen not palpable  Perianal exam shows normal skin without irritation. No perianal erythema or fluctuance. No external hemorrhoids evident. tINY SKIN TAG. Internal hemorrhoids grade I-II noted. has small posterior anal fissure noted on anoscopy.

## 2025-04-16 PROBLEM — 1231824009: Status: ACTIVE | Noted: 2025-04-16

## 2025-05-06 ENCOUNTER — OFFICE VISIT (OUTPATIENT)
Dept: URBAN - METROPOLITAN AREA CLINIC 78 | Facility: CLINIC | Age: 55
End: 2025-05-06
Payer: COMMERCIAL

## 2025-05-06 DIAGNOSIS — K64.1 BLEEDING GRADE II HEMORRHOIDS: ICD-10-CM

## 2025-05-06 PROCEDURE — 99214 OFFICE O/P EST MOD 30 MIN: CPT | Performed by: INTERNAL MEDICINE

## 2025-05-06 PROCEDURE — 46221 LIGATION OF HEMORRHOID(S): CPT | Performed by: INTERNAL MEDICINE

## 2025-05-06 NOTE — HPI-TODAY'S VISIT:
The patient was referred to us by Fiorella Iyer for anal bleeding and anal pain. A copy of this note will be sent to the referring physician.  She is here for a second opinion regarding hemorrhoid banding,  She was previously seen by Dr. Sindy Cárdenas for anal itching and burning since 5/2023, abdominal pain and weight gain.   She states she was banded x2 (she completed banding of the left lateral and R posterior hemorrhoidal plexuses) but her insurance never covered it. She has a large bill now to pay.  Of note, the R posterior hemorrhoidal column was banded but the band dislodge soon after.  She has ~ 4BMs a weeks. She started consuming Fiber One + fiber supplements and Miralax with good results. If she has a harder BM, then she notices that her hemorrhoidal symptom are exacerbated. She uses wipes which has helped as well. She has mucus in her stools. Her stools have been dark brown but not black/tarry.   She tells me today that she only used the HC suppositories I prescribed for a couple of days instead of BID for 7 days. She continues to struggle with constipation.  She has frequently seen mucus in her stools. She has not seen blood lately. She has been suing the NTG ointment twice a day and has in fact noted that she has had less anal pain.  She has been struggling with depression. She is on Tirzepatide and is on therapy.She has been able to lose some weight on Tirzepatide.  Patient states that she has a lot of bloating and tenderness in her lower abdominal area.   She has had improvement in her rectal pressure; she previously noted a sense of incomplete evacuation. She tries not to strain.    She has not had heartburn, nausea, vomiting or dysphagia.  Her GM had stomach cancer. There is no FH of colon cancer, her father had colon polyps.     She has had recurrent UTIs.       The patient does not take blood thinners.       They deny any CP or GOTTI.     She had been told she had hepatic steatosis. She last had normal liver enzymes a few months ago.         Summary of prior workup: - EGD/colonoscopy by Dr. Raul Sheets on 10/29/24: Irregular Z-line.  Erythematous antrum.  Normal duodenum.  \f1 Normal terminal ileum.  Diminutive TA in the hepatic flexure.  2  TAs in the transverse colon.  Nonbleeding hemorrhoids noted on retroflexion.  Quality of the prep was good a repeat colonoscopy was advised in 3 years - Labs on 8/6/2024: Total cholesterol 213, HDL 45, triglycerides 92, , glucose 78, BUN 15, creatinine 0.5, sodium 139, potassium 4.4, calcium 9.2, total protein 6.4, albumin 4.2, total bilirubin 0.3, alkaline phosphatase 58, AST 13, ALT 26.  WBC 6.5, hemoglobin 14.6, MCV 97, platelets 272. -MRI of the abdomen with and without contrast on 9/6/2023: Mild diffuse hepatic steatosis.  Subcentimeter lesion in the posterior segment of the right hepatic lobe consistent with a flash filling hemangioma.  Duplicated renal collecting system bilaterally, normal variant anatomy.  No hydronephrosis.  Small renal cysts. - E/C in 2021  By Dr. Raul Sheets: Irregular Z-line, antral erythema.  Normal duodenum.  Biopsies negative for H. pylori, BE sent or EoE. Normal terminal ileum.  Diffuse melanosis throughout the entire colon.  Nonbleeding internal hemorrhoids noted on retroflexion.  Quality of the prep was good. - E/C by Raul Sheets on 11/14/2019: Irregular Z-line.  Normal stomach and duodenum.  Normal terminal ileum, 2 tubular adenomas removed from the sigmoid and transverse colon.  Diffuse melanosis.  Nonbleeding internal hemorrhoids. - EGD on 9/14/2018: LA grade a reflux esophagitis. - Colonoscopy on 7/21/2017: 2 mm TA in the hepatic flexure.  Normal terminal ileum.  Nonbleeding internal hemorrhoids.  -CT abdomen with contrast on 10/1/2015: Constipation.  Focal area of enhancement involving the peripheral right lobe of the liver representing a flash filling hemangioma.  No free fluid or free air.  Tiny inguinal lymph nodes.  No acute inflammatory process involving the pelvis.  Scattered vascular calcifications.  Spleen, adrenal glands and pancreas normal.  Probable cortical cysts in both kidneys.

## 2025-05-06 NOTE — PHYSICAL EXAM GASTROINTESTINAL
Abdomen , soft, nontender, nondistended , no guarding or rigidity , no masses palpable , normal bowel sounds , Liver and Spleen , no hepatomegaly present , no hepatosplenomegaly , liver nontender , spleen not palpable erianal exam shows normal skin without irritation. No perianal erythema or fluctuance. No external hemorrhoids evident. Tiny skin tag. Internal hemorrhoids grade I-II noted. Has small posterior anal fissure.

## 2025-05-19 ENCOUNTER — OFFICE VISIT (OUTPATIENT)
Dept: URBAN - METROPOLITAN AREA CLINIC 78 | Facility: CLINIC | Age: 55
End: 2025-05-19

## 2025-05-22 ENCOUNTER — OFFICE VISIT (OUTPATIENT)
Dept: URBAN - METROPOLITAN AREA CLINIC 78 | Facility: CLINIC | Age: 55
End: 2025-05-22

## 2025-05-30 ENCOUNTER — OFFICE VISIT (OUTPATIENT)
Dept: URBAN - NONMETROPOLITAN AREA CLINIC 4 | Facility: CLINIC | Age: 55
End: 2025-05-30

## 2025-06-05 ENCOUNTER — OFFICE VISIT (OUTPATIENT)
Dept: URBAN - METROPOLITAN AREA CLINIC 78 | Facility: CLINIC | Age: 55
End: 2025-06-05
Payer: COMMERCIAL

## 2025-06-05 DIAGNOSIS — K64.1 BLEEDING GRADE II HEMORRHOIDS: ICD-10-CM

## 2025-06-05 PROCEDURE — 46221 LIGATION OF HEMORRHOID(S): CPT | Performed by: INTERNAL MEDICINE

## 2025-06-05 PROCEDURE — 99214 OFFICE O/P EST MOD 30 MIN: CPT | Performed by: INTERNAL MEDICINE

## 2025-06-05 NOTE — PHYSICAL EXAM GASTROINTESTINAL
Abdomen , soft, nontender, nondistended , no guarding or rigidity , no masses palpable , normal bowel sounds , Liver and Spleen , no hepatomegaly present , no hepatosplenomegaly , liver nontender , spleen not palpable  Perianal exam shows normal skin without irritation. No perianal erythema or fluctuance. No external hemorrhoids evident. She does have a tiny skin tag. Internal hemorrhoids grade I-II noted. Has small posterior anal fissure (healing)

## 2025-06-05 NOTE — HPI-TODAY'S VISIT:
The patient was referred to us by Fiorella Iyer for anal bleeding and anal pain. A copy of this note will be sent to the referring physician.  She is here for further hemorrhoid banding,  She was previously seen by Dr. Sindy Cárdenas for anal itching and burning since 5/2023, abdominal pain and weight gain.   She states she was banded x2 (she completed banding of the left lateral and R posterior hemorrhoidal plexuses) but her insurance never covered it. She has a large bill now to pay.  Of note, the R posterior hemorrhoidal column was banded but the band dislodged soon after.  Following the last banding she had a bit of bleeding and discomfort which last 1-2 days.  She is a bit more constipated. She took Miraalx a couple of days ago, but admits that she has not been using it on a regular basis. She had some spicy foods followed by a sharp pain in the LUQ.  She is having less bloating than before.   If she has a harder BM, then she notices that her hemorrhoidal symptom are exacerbated. She uses wipes which has helped as well. She has mucus in her stools.   She has been suing the NTG ointment twice a day and has in fact noted that she has had less anal pain.  She has been struggling with depression. She is on Tirzepatide and is on therapy.She has been able to lose some weight on Tirzepatide.  She has not had heartburn, nausea, vomiting or dysphagia.  Her GM had stomach cancer. There is no FH of colon cancer, her father had colon polyps.     She has had recurrent UTIs.       The patient does not take blood thinners.       They deny any CP or GOTTI.     She had been told she had hepatic steatosis. She last had normal liver enzymes a few months ago.         Summary of prior workup: - EGD/colonoscopy by Dr. Raul Sheets on 10/29/24: Irregular Z-line.  Erythematous antrum.  Normal duodenum.  \f1 Normal terminal ileum.  Diminutive TA in the hepatic flexure.  2  TAs in the transverse colon.  Nonbleeding hemorrhoids noted on retroflexion.  Quality of the prep was good a repeat colonoscopy was advised in 3 years - Labs on 8/6/2024: Total cholesterol 213, HDL 45, triglycerides 92, , glucose 78, BUN 15, creatinine 0.5, sodium 139, potassium 4.4, calcium 9.2, total protein 6.4, albumin 4.2, total bilirubin 0.3, alkaline phosphatase 58, AST 13, ALT 26.  WBC 6.5, hemoglobin 14.6, MCV 97, platelets 272. -MRI of the abdomen with and without contrast on 9/6/2023: Mild diffuse hepatic steatosis.  Subcentimeter lesion in the posterior segment of the right hepatic lobe consistent with a flash filling hemangioma.  Duplicated renal collecting system bilaterally, normal variant anatomy.  No hydronephrosis.  Small renal cysts. - E/C in 2021  By Dr. Raul Sheets: Irregular Z-line, antral erythema.  Normal duodenum.  Biopsies negative for H. pylori, BE sent or EoE. Normal terminal ileum.  Diffuse melanosis throughout the entire colon.  Nonbleeding internal hemorrhoids noted on retroflexion.  Quality of the prep was good. - E/C by Raul Sheets on 11/14/2019: Irregular Z-line.  Normal stomach and duodenum.  Normal terminal ileum, 2 tubular adenomas removed from the sigmoid and transverse colon.  Diffuse melanosis.  Nonbleeding internal hemorrhoids. - EGD on 9/14/2018: LA grade a reflux esophagitis. - Colonoscopy on 7/21/2017: 2 mm TA in the hepatic flexure.  Normal terminal ileum.  Nonbleeding internal hemorrhoids.  -CT abdomen with contrast on 10/1/2015: Constipation.  Focal area of enhancement involving the peripheral right lobe of the liver representing a flash filling hemangioma.  No free fluid or free air.  Tiny inguinal lymph nodes.  No acute inflammatory process involving the pelvis.  Scattered vascular calcifications.  Spleen, adrenal glands and pancreas normal.  Probable cortical cysts in both kidneys.

## 2025-07-14 ENCOUNTER — OFFICE VISIT (OUTPATIENT)
Dept: URBAN - METROPOLITAN AREA CLINIC 78 | Facility: CLINIC | Age: 55
End: 2025-07-14
Payer: COMMERCIAL

## 2025-07-14 ENCOUNTER — LAB OUTSIDE AN ENCOUNTER (OUTPATIENT)
Dept: URBAN - METROPOLITAN AREA CLINIC 78 | Facility: CLINIC | Age: 55
End: 2025-07-14

## 2025-07-14 DIAGNOSIS — K64.0 BLEEDING GRADE I HEMORRHOIDS: ICD-10-CM

## 2025-07-14 PROCEDURE — 99214 OFFICE O/P EST MOD 30 MIN: CPT | Performed by: INTERNAL MEDICINE

## 2025-07-14 PROCEDURE — 46221 LIGATION OF HEMORRHOID(S): CPT | Performed by: INTERNAL MEDICINE

## 2025-07-14 NOTE — HPI-TODAY'S VISIT:
The patient was referred to us by Fiorella Iyer for anal bleeding and anal pain. A copy of this note will be sent to the referring physician.  She is here for further hemorrhoid banding,  Following the last banding she had a bit of bleeding and discomfort which last 1-2 days.  She is a bit more constipated. I had recomended she take Miralax daily, but she didn't really do that.  She is unsure if the last hemorrhoid banding worked that well. She had some spicy foods followed by a sharp pain in the LUQ.  She is having less bloating than before.   She does feel taht the anal pain is now rare. She is feeling 60% better but still has some pain when she sits. Since her last appt she got sick and admits that she wasn't drinking as much fluid hence she did get to be a bit more constipated.  She has some mucus in her stools.   She has been compliant with using the NTG ointment twice a day. The anal pain has resolved.  She has been struggling with depression. She is on Tirzepatide and is on therapy.She has been able to lose some weight on Tirzepatide.  She has not had heartburn, nausea, vomiting or dysphagia.  Her GM had stomach cancer. There is no FH of colon cancer, her father had colon polyps.       The patient does not take blood thinners.       They deny any CP or GOTTI.     She had been told she had hepatic steatosis. She last had normal liver enzymes a few months ago. We reviewed labs form July 2025 today.         Summary of prior workup:- - Labs on 7/8/2025: Glucose 81, BUN 13, creatinine 0.59, sodium 141, potassium 4.4, CO2 28, calcium 9.1, total protein 6.2, albumin 4.3, total bilirubin 0.2, alkaline phosphatase 54, AST 14, ALT 18.  Total cholesterol 148, HDL 67, triglycerides 57.  LDL 68. - EGD/colonoscopy by Dr. Raul Sheets on 10/29/24: Irregular Z-line.  Erythematous antrum.  Normal duodenum.  \f1 Normal terminal ileum.  Diminutive TA in the hepatic flexure.  2  TAs in the transverse colon.  Nonbleeding hemorrhoids noted on retroflexion.  Quality of the prep was good a repeat colonoscopy was advised in 3 years - Labs on 8/6/2024: Total cholesterol 213, HDL 45, triglycerides 92, , glucose 78, BUN 15, creatinine 0.5, sodium 139, potassium 4.4, calcium 9.2, total protein 6.4, albumin 4.2, total bilirubin 0.3, alkaline phosphatase 58, AST 13, ALT 26.  WBC 6.5, hemoglobin 14.6, MCV 97, platelets 272. -MRI of the abdomen with and without contrast on 9/6/2023: Mild diffuse hepatic steatosis.  Subcentimeter lesion in the posterior segment of the right hepatic lobe consistent with a flash filling hemangioma.  Duplicated renal collecting system bilaterally, normal variant anatomy.  No hydronephrosis.  Small renal cysts. - E/C in 2021  By Dr. Raul Sheets: Irregular Z-line, antral erythema.  Normal duodenum.  Biopsies negative for H. pylori, BE sent or EoE. Normal terminal ileum.  Diffuse melanosis throughout the entire colon.  Nonbleeding internal hemorrhoids noted on retroflexion.  Quality of the prep was good. - E/C by Raul Sheets on 11/14/2019: Irregular Z-line.  Normal stomach and duodenum.  Normal terminal ileum, 2 tubular adenomas removed from the sigmoid and transverse colon.  Diffuse melanosis.  Nonbleeding internal hemorrhoids. - EGD on 9/14/2018: LA grade a reflux esophagitis. - Colonoscopy on 7/21/2017: 2 mm TA in the hepatic flexure.  Normal terminal ileum.  Nonbleeding internal hemorrhoids.  -CT abdomen with contrast on 10/1/2015: Constipation.  Focal area of enhancement involving the peripheral right lobe of the liver representing a flash filling hemangioma.  No free fluid or free air.  Tiny inguinal lymph nodes.  No acute inflammatory process involving the pelvis.  Scattered vascular calcifications.  Spleen, adrenal glands and pancreas normal.  Probable cortical cysts in both kidneys.

## 2025-07-14 NOTE — PHYSICAL EXAM GASTROINTESTINAL
Abdomen , soft, nontender, nondistended , no guarding or rigidity , no masses palpable , normal bowel sounds , Liver and Spleen , no hepatomegaly present , no hepatosplenomegaly , liver nontender , spleen not palpable Perianal exam shows normal skin without irritation. No perianal erythema or fluctuance. No thrombosed external hemorrhoids evident. Internal hemorrhoids grade I noted

## 2025-07-28 ENCOUNTER — WEB ENCOUNTER (OUTPATIENT)
Dept: URBAN - METROPOLITAN AREA CLINIC 78 | Facility: CLINIC | Age: 55
End: 2025-07-28

## 2025-07-31 ENCOUNTER — OFFICE VISIT (OUTPATIENT)
Dept: URBAN - METROPOLITAN AREA CLINIC 36 | Facility: CLINIC | Age: 55
End: 2025-07-31

## 2025-08-05 ENCOUNTER — TELEPHONE ENCOUNTER (OUTPATIENT)
Dept: URBAN - METROPOLITAN AREA CLINIC 35 | Facility: CLINIC | Age: 55
End: 2025-08-05

## 2025-08-05 ENCOUNTER — CLAIMS CREATED FROM THE CLAIM WINDOW (OUTPATIENT)
Dept: URBAN - METROPOLITAN AREA CLINIC 117 | Facility: CLINIC | Age: 55
End: 2025-08-05
Payer: COMMERCIAL

## 2025-08-05 DIAGNOSIS — K76.0 FATTY (CHANGE OF) LIVER: ICD-10-CM

## 2025-08-05 DIAGNOSIS — K74.01 EARLY HEPATIC FIBROSIS: ICD-10-CM

## 2025-08-05 PROCEDURE — 76981 USE PARENCHYMA: CPT | Performed by: INTERNAL MEDICINE

## 2025-08-10 ENCOUNTER — WEB ENCOUNTER (OUTPATIENT)
Dept: URBAN - METROPOLITAN AREA CLINIC 78 | Facility: CLINIC | Age: 55
End: 2025-08-10

## 2025-08-12 ENCOUNTER — WEB ENCOUNTER (OUTPATIENT)
Dept: URBAN - METROPOLITAN AREA CLINIC 78 | Facility: CLINIC | Age: 55
End: 2025-08-12

## 2025-08-13 ENCOUNTER — WEB ENCOUNTER (OUTPATIENT)
Dept: URBAN - METROPOLITAN AREA CLINIC 78 | Facility: CLINIC | Age: 55
End: 2025-08-13